# Patient Record
Sex: MALE | Race: WHITE | NOT HISPANIC OR LATINO
[De-identification: names, ages, dates, MRNs, and addresses within clinical notes are randomized per-mention and may not be internally consistent; named-entity substitution may affect disease eponyms.]

---

## 2017-12-22 ENCOUNTER — TRANSCRIPTION ENCOUNTER (OUTPATIENT)
Age: 22
End: 2017-12-22

## 2018-08-03 ENCOUNTER — TRANSCRIPTION ENCOUNTER (OUTPATIENT)
Age: 23
End: 2018-08-03

## 2018-08-03 ENCOUNTER — EMERGENCY (EMERGENCY)
Facility: HOSPITAL | Age: 23
LOS: 1 days | Discharge: HOME | End: 2018-08-03
Attending: EMERGENCY MEDICINE
Payer: COMMERCIAL

## 2018-08-03 VITALS
HEART RATE: 56 BPM | OXYGEN SATURATION: 100 % | TEMPERATURE: 98 F | WEIGHT: 169.98 LBS | RESPIRATION RATE: 20 BRPM | DIASTOLIC BLOOD PRESSURE: 70 MMHG | SYSTOLIC BLOOD PRESSURE: 147 MMHG

## 2018-08-03 VITALS
SYSTOLIC BLOOD PRESSURE: 153 MMHG | OXYGEN SATURATION: 98 % | DIASTOLIC BLOOD PRESSURE: 75 MMHG | RESPIRATION RATE: 18 BRPM | HEART RATE: 54 BPM | TEMPERATURE: 98 F

## 2018-08-03 PROBLEM — Z00.00 ENCOUNTER FOR PREVENTIVE HEALTH EXAMINATION: Status: ACTIVE | Noted: 2018-08-03

## 2018-08-03 LAB
APPEARANCE UR: CLEAR — SIGNIFICANT CHANGE UP
BILIRUB UR-MCNC: NEGATIVE — SIGNIFICANT CHANGE UP
COLOR SPEC: SIGNIFICANT CHANGE UP
DIFF PNL FLD: NEGATIVE — SIGNIFICANT CHANGE UP
GLUCOSE UR QL: NEGATIVE — SIGNIFICANT CHANGE UP
KETONES UR-MCNC: NEGATIVE — SIGNIFICANT CHANGE UP
LEUKOCYTE ESTERASE UR-ACNC: NEGATIVE — SIGNIFICANT CHANGE UP
NITRITE UR-MCNC: NEGATIVE — SIGNIFICANT CHANGE UP
PH UR: 6.5 — SIGNIFICANT CHANGE UP (ref 5–8)
PROT UR-MCNC: NEGATIVE — SIGNIFICANT CHANGE UP
RBC CASTS # UR COMP ASSIST: SIGNIFICANT CHANGE UP /HPF (ref 0–2)
SP GR SPEC: 1.01 — SIGNIFICANT CHANGE UP (ref 1.01–1.02)
UROBILINOGEN FLD QL: NEGATIVE — SIGNIFICANT CHANGE UP
WBC UR QL: SIGNIFICANT CHANGE UP /HPF (ref 0–5)

## 2018-08-03 PROCEDURE — 76870 US EXAM SCROTUM: CPT | Mod: 26

## 2018-08-03 PROCEDURE — 99284 EMERGENCY DEPT VISIT MOD MDM: CPT | Mod: 25

## 2018-08-03 PROCEDURE — 87491 CHLMYD TRACH DNA AMP PROBE: CPT

## 2018-08-03 PROCEDURE — 93975 VASCULAR STUDY: CPT | Mod: 26

## 2018-08-03 PROCEDURE — 93975 VASCULAR STUDY: CPT

## 2018-08-03 PROCEDURE — 87086 URINE CULTURE/COLONY COUNT: CPT

## 2018-08-03 PROCEDURE — 99284 EMERGENCY DEPT VISIT MOD MDM: CPT

## 2018-08-03 PROCEDURE — 81001 URINALYSIS AUTO W/SCOPE: CPT

## 2018-08-03 PROCEDURE — 76870 US EXAM SCROTUM: CPT

## 2018-08-03 PROCEDURE — 87591 N.GONORRHOEAE DNA AMP PROB: CPT

## 2018-08-03 RX ORDER — FINASTERIDE 5 MG/1
0 TABLET, FILM COATED ORAL
Qty: 0 | Refills: 0 | COMMUNITY

## 2018-08-03 NOTE — ED PEDIATRIC NURSE NOTE - NSIMPLEMENTINTERV_GEN_ALL_ED
Implemented All Universal Safety Interventions:  [place] to call system. Call bell, personal items and telephone within reach. Instruct patient to call for assistance. Room bathroom lighting operational. Non-slip footwear when patient is off stretcher. Physically safe environment: no spills, clutter or unnecessary equipment. Stretcher in lowest position, wheels locked, appropriate side rails in place.

## 2018-08-03 NOTE — ED PROVIDER NOTE - OBJECTIVE STATEMENT
4 PM today was on the train when he had onset of pain in the left testicle, rates it as a 7 out of 10, notes that it is a throbbing sensation, nothing makes it better, can make it worse by pressing on the testicle. States that the pain seems to have resolved somewhat over the past two hours and is now only a 4 / 10, had initially noticed some discoloration of the testicle--appeared "purpleish", states its coloration has returned to normal. 4 PM today was on the train when he had onset of pain in the left testicle, rates it as a 7 out of 10, notes that it is a throbbing sensation, nothing makes it better, can make it worse by pressing on the testicle. States that the pain seems to have resolved somewhat over the past two hours and is now only a 4 / 10, had initially noticed some discoloration of the testicle--appeared "reddish", states its coloration has returned to normal.

## 2018-08-03 NOTE — ED PROVIDER NOTE - NS ED ROS FT
General: No fevers / chills  HENT: No ear pain, runny nose, or sore throat  Eyes: No visual changes  CP: No chest pain, palpitations, or light headedness  Resp: No shortness of breath, no cough  GI: + abdominal pain, no diarrhea, no constipation, no nausea, no vomiting  : No dysuria or hematuria  Neuro: No numbness, tingling, or weakness  Endo: No hx of diabetes  Heme: No hx of easy bleeding or bruising

## 2018-08-03 NOTE — ED PROVIDER NOTE - PHYSICAL EXAMINATION
Gen: NAD, non-toxic, conversational  Eyes: PERRL, EOMI   HENT: Normocephalic, atraumatic. External ears normal, no rhinorrhea, moist mucous membranes.   CV: RRR, no M/R/G  Resp: CTAB, non-labored, speaking without difficulty on room air  Abd: soft, non tender, non rigid, no guarding or rebound tenderness  : Mild left testicular ttp diffuse, not localized to epididymitis on right or left, no inguinal hernias, no lesions of the penile shaft, no uretheral discharge.   Back: No CVAT bilaterally, no midline ttp  Skin: dry, wwp   Neuro: AOx3, speech is fluent and appropriate  Psych: Mood good, affect euthymic

## 2018-08-03 NOTE — ED PROVIDER NOTE - MEDICAL DECISION MAKING DETAILS
22y.o. o/w healthy male presenting for evaluation left testicular pain that began 4.5 hours pta, on exam absent cremasteric reflex bilaterally but no discoloration, swelling, or obvious ttp, will eval with urinalysis and testicular u/s, follow up studies, reassess, dispo. 22y.o. o/w healthy male presenting for evaluation left testicular pain that began 4.5 hours pta, on exam absent cremasteric reflex bilaterally but no discoloration, swelling, or obvious ttp, will eval with urinalysis and testicular u/s, follow up studies, reassess, dispo.    Linda Yanes MD - Attending Physician: Pt here with left testicular pain, no associated symptoms. Self improving. Exam unremarkable. US, Ua, Gc/Chl

## 2018-08-03 NOTE — ED PROVIDER NOTE - ATTENDING CONTRIBUTION TO CARE
Linda Yanes MD - Attending Physician: I have personally seen and examined this patient with the resident/fellow.  I have fully participated in the care of this patient. I have reviewed all pertinent clinical information, including history, physical exam, plan and the Resident/Fellow’s note and agree except as noted. See MDM

## 2018-08-03 NOTE — ED PROVIDER NOTE - CARE PLAN
Principal Discharge DX:	Testicular pain, left Principal Discharge DX:	Testicular pain, left  Secondary Diagnosis:	Varicocele

## 2018-08-04 LAB
C TRACH RRNA SPEC QL NAA+PROBE: SIGNIFICANT CHANGE UP
CULTURE RESULTS: NO GROWTH — SIGNIFICANT CHANGE UP
N GONORRHOEA RRNA SPEC QL NAA+PROBE: SIGNIFICANT CHANGE UP
SPECIMEN SOURCE: SIGNIFICANT CHANGE UP
SPECIMEN SOURCE: SIGNIFICANT CHANGE UP

## 2019-05-29 ENCOUNTER — TRANSCRIPTION ENCOUNTER (OUTPATIENT)
Age: 24
End: 2019-05-29

## 2021-02-22 ENCOUNTER — APPOINTMENT (OUTPATIENT)
Dept: ORTHOPEDIC SURGERY | Facility: CLINIC | Age: 26
End: 2021-02-22
Payer: COMMERCIAL

## 2021-02-22 PROCEDURE — 99072 ADDL SUPL MATRL&STAF TM PHE: CPT

## 2021-02-22 PROCEDURE — 99204 OFFICE O/P NEW MOD 45 MIN: CPT

## 2021-02-22 PROCEDURE — 73030 X-RAY EXAM OF SHOULDER: CPT | Mod: LT

## 2021-02-22 RX ORDER — MELOXICAM 7.5 MG/1
7.5 TABLET ORAL
Qty: 21 | Refills: 0 | Status: COMPLETED | COMMUNITY
Start: 2021-02-22 | End: 2021-03-15

## 2021-02-22 NOTE — PHYSICAL EXAM
[de-identified] : Physical Exam:\par General: Well appearing, no acute distress\par Neurologic: A&Ox3, No focal deficits\par Head: NCAT without abrasions, lacerations, or ecchymosis to head, face, or scalp\par Eyes: No scleral icterus, no gross abnormalities\par Respiratory: Equal chest wall expansion bilaterally, no accessory muscle use\par Lymphatic: No lymphadenopathy palpated\par Skin: Warm and dry\par Psychiatric: Normal mood and affect\par \par Left Shoulder:\par ·	Inspection/Palpation: no tenderness, swelling or deformities\par ·	Range of Motion: full and painless in all planes, no crepitus; FF 0-170; ER at side 45; IR to T7\par ·	Strength: forward elevation in scapular plane [_/5], internal rotation [_/5], external rotation [_/5], adduction [_/5] and abduction [_/5]\par ·	Stability: load and shift test positive, apprehension test positive, relocation test positive, sulcus sign negative; Marissa test [negative/positive], Jerk test [negative/positive]\par ·	Tests: Ta negative, Neer negative, negative drop arm test, bear hug test negative, Napolean sign negative, cross arm adduction [negative/positive], lift off sign negative, hornblowers sign negative, speeds test [positive/negative], Yergason's test [positive/negative], bicipital groove tenderness [positive/negative], Moreno's Active Compression test [positive/negative], bicep's load II test [negative/positive]\par \par \par Right Shoulder\par ·	Inspection/Palpation: no tenderness, swelling or deformities\par ·	Range of Motion: full and painless in all planes, no crepitus\par ·	Strength: forward elevation in scapular plane 5/5, internal rotation 5/5, external rotation 5/5, adduction 5/5 and abduction 5/5\par ·	Stability: no joint instability on provocative testing\par ·	Tests: Ta test negative, Neer sign negative, negative drop arm test secondary to pain, bear hug test negative, Napolean sign negative, cross arm adduction negative, lift off sign positive, hornblowers sign negative, speeds test negative, Yergason's test negative, no bicipital groove tenderness, Moreno's Active Compression test negative [de-identified] : MRI L shoulder at [facility] on 01/22/2021 was available for me to review. Results of MRI were discussed thoroughly with patient. L shoulder imaging revealed:\par \par Tearing of the superior and inferior labrum. Additionally suspected anterior labral tearing.\par \par Small impaction injury along the posterior superior aspect of the humeral head, compatible with a Hill-Sachs deformity.\par \par Small joint effusion. 6 mm intra-articular body along the proximal most aspect of the subscapularis recess. \par \par The following radiographs were ordered and read by me during this patients visit. I reviewed each radiograph in detail with the patient and discussed the findings as highlighted below. \par \par 4 views of the left shoulder show no fracture, dislocation or bony lesions. There is no evidence of degenerative change in the glenohumeral and acromioclavicular joints with maintenance of the joint space. Otherwise unremarkable.

## 2021-02-22 NOTE — DISCUSSION/SUMMARY
[de-identified] : CELESTINO KOEHLER is a 25 year male being seen for initial visit L shoulder pain. He reports MIRANDA as dislocation while wrestling on 01/15/2021. Patient denies numbness and tingling to the extremities. He denies use of OTC pain medications. He has not done any formal PT. He had MRI performed at [facility] on 01/22/2021. He saw Dr Martinez through [facility] prior to review this he recommended either PT or surgery based on his MRI Results.\par \par This was his first time dislocating and since has not had subsequent events. Prior he admits to cracking in the shoulder but no subluxation events. He is an avid rock climber, plays soccer and performs [employer].\par \par We had a thorough discussion regarding the nature of his pain, the pathophysiology, as well as all treatment options. We reviewed xrays taken today that were normal, no dislocation. We reviewed previous MRI results as well. With a history of first time dislocation, I've recommended a trial of nonoperative intervention with Mobic as directed and formal PT 2x/week x 6-8 weeks. We will see him back at that ti'me for clinical reassessment. He another dislocation event occurs or if he is refractory, we will further discuss surgical intervention. He agrees with the above plan and all questions were answered.\par

## 2021-02-22 NOTE — HISTORY OF PRESENT ILLNESS
[Stable] : stable [___ wks] : [unfilled] week(s) ago [3] : an average pain level of 3/10 [Lifting] : worsened by lifting [de-identified] : CELESTINO KOEHLER is a 25 year male being seen for initial visit L shoulder pain. He reports MIRANDA as dislocation while wrestling on 01/15/2021. Patient denies numbness and tingling to the extremities. He denies use of OTC pain medications. He has not done any formal PT. He had MRI performed at [facility] on 01/22/2021. He saw Dr Martinez through [facility] prior to review this he recommended either PT or surgery based on his MRI Results.\par \par This was his first time dislocating and since has not had subsequent events. Prior he admits to cracking in the shoulder but no subluxation events. 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

## 2021-03-05 ENCOUNTER — NON-APPOINTMENT (OUTPATIENT)
Age: 26
End: 2021-03-05

## 2021-03-05 ENCOUNTER — APPOINTMENT (OUTPATIENT)
Dept: ORTHOPEDIC SURGERY | Facility: AMBULATORY SURGERY CENTER | Age: 26
End: 2021-03-05
Payer: COMMERCIAL

## 2021-03-05 PROCEDURE — 29807 SHO ARTHRS SRG RPR SLAP LES: CPT | Mod: 59,LT

## 2021-03-05 PROCEDURE — 29806 SHO ARTHRS SRG CAPSULORRAPHY: CPT | Mod: LT

## 2021-03-05 RX ORDER — ACETAMINOPHEN 500 MG/1
500 TABLET ORAL
Qty: 30 | Refills: 0 | Status: COMPLETED | COMMUNITY
Start: 2021-03-05 | End: 2021-03-15

## 2021-03-05 RX ORDER — OXYCODONE 5 MG/1
5 TABLET ORAL
Qty: 10 | Refills: 0 | Status: COMPLETED | COMMUNITY
Start: 2021-03-05 | End: 2021-03-10

## 2021-03-05 RX ORDER — ONDANSETRON 4 MG/1
4 TABLET ORAL
Qty: 18 | Refills: 0 | Status: COMPLETED | COMMUNITY
Start: 2021-03-05 | End: 2021-03-10

## 2021-03-10 ENCOUNTER — NON-APPOINTMENT (OUTPATIENT)
Age: 26
End: 2021-03-10

## 2021-03-10 RX ORDER — CLINDAMYCIN HYDROCHLORIDE 300 MG/1
300 CAPSULE ORAL EVERY 8 HOURS
Qty: 15 | Refills: 1 | Status: ACTIVE | COMMUNITY
Start: 2021-03-10 | End: 1900-01-01

## 2021-03-18 ENCOUNTER — APPOINTMENT (OUTPATIENT)
Dept: ORTHOPEDIC SURGERY | Facility: CLINIC | Age: 26
End: 2021-03-18
Payer: COMMERCIAL

## 2021-03-18 PROCEDURE — 99024 POSTOP FOLLOW-UP VISIT: CPT

## 2021-03-18 PROCEDURE — 73030 X-RAY EXAM OF SHOULDER: CPT | Mod: LT

## 2021-03-18 NOTE — HISTORY OF PRESENT ILLNESS
[Xray (Date:___)] : [unfilled] Xray -  [___ Days Post Op] : post op day #[unfilled] [Clean/Dry/Intact] : clean, dry and intact [Neuro Intact] : an unremarkable neurological exam [Vascular Intact] : ~T peripheral vascular exam normal [Doing Well] : is doing well [Excellent Pain Control] : has excellent pain control [Chills] : no chills [Fever] : no fever [Nausea] : no nausea [Vomiting] : no vomiting [de-identified] : s/p left shoulder arthroscopy with bankart repair, capsulorraphy, SLAP repair, rmeoval of loose bodies, DOS: 3/5/21 [de-identified] : Brian is a 25 year old male who is s/p left shoulder arthroscopy with bankart repair, capsulorraphy, SLAP repair, rmeoval of loose bodies, 13 days ago. He denies fever/ chills or signs of infection. He presents today in his sling. He did not require narcotics for pain. He has not started PT yet. Patient denies numbness and tingling to the extremities. He reports some feelings of instability in the shoulder [de-identified] : Left Shoulder\par \par Incisions are clean, dry and intact. No surrounding erythema. No drainage. Wound appears to be healing well. Unable to test ROM due to recent surgical procedure. Motor and sensation are intact distally. He has full range of motion of all fingers with capillary refill of <2 seconds throughout. He has good nerve function and median nerve, ulnar, radial, PIN, AIN. He has no sensory deficits over the C5-T1 nerve roots. Radial pulses 2+. Able to make an A-OK sign and thumbs up sign: able to flex/extend thumb, able to cross middle over index finger. \par \par Full ROM elbow, wrist, hand [de-identified] : 2 view xrays of pts left shoulder were performed today and available for me to review. They demonstrate adequate alignment. No fracture. No dislocation. No other deformities. [de-identified] : CELESTINO is a 25 year male who is doing extremely well and is without complaints. He presents today in his sling on. Surgical sites are clean and dry without warmth or erythema, pt denies fever or chills.  He is to remain in the sling until we see him again at his next post-op filemon and continue with being fully nonweightbearing to this extremity. He will start PT in 2 weeks from today 2-3x/week alongside HEP until we see them again in 4 weeks for clinical reassessment. He may use tylenol prn for pain. He agrees to the latter plan and does not have any further questions or concerns.

## 2021-04-20 ENCOUNTER — APPOINTMENT (OUTPATIENT)
Dept: ORTHOPEDIC SURGERY | Facility: CLINIC | Age: 26
End: 2021-04-20

## 2021-04-23 ENCOUNTER — APPOINTMENT (OUTPATIENT)
Dept: ORTHOPEDIC SURGERY | Facility: CLINIC | Age: 26
End: 2021-04-23

## 2021-04-30 ENCOUNTER — APPOINTMENT (OUTPATIENT)
Dept: ORTHOPEDIC SURGERY | Facility: CLINIC | Age: 26
End: 2021-04-30
Payer: COMMERCIAL

## 2021-04-30 PROCEDURE — 99024 POSTOP FOLLOW-UP VISIT: CPT

## 2021-04-30 NOTE — HISTORY OF PRESENT ILLNESS
[___ Weeks Post Op] : [unfilled] weeks post op [Clean/Dry/Intact] : clean, dry and intact [Neuro Intact] : an unremarkable neurological exam [Vascular Intact] : ~T peripheral vascular exam normal [Doing Well] : is doing well [Excellent Pain Control] : has excellent pain control [Chills] : no chills [Fever] : no fever [Nausea] : no nausea [Vomiting] : no vomiting [de-identified] : s/p left shoulder arthroscopy with bankart repair, capsulorraphy, SLAP repair, removal of loose bodies, DOS: 03/05/21 [de-identified] : Brian is a 25 year old male who is s/p left shoulder arthroscopy with bankart repair, capsulorraphy, SLAP repair, removal of loose bodies, 8 weeks ago. He reports going to PT 2x/week with relief in pain and increase in ROM. Patient denies numbness and tingling to the extremities. He denies use of OTC pain medications. He admits to some pain with ER at 0 deg ABD. [de-identified] : Left Shoulder\par \par Incisions are clean, dry and intact. No surrounding erythema. No drainage. Wounds healed well. Active ROM 0-140, ER to 15, IR to back pocket. Motor and sensation are intact distally. He has full range of motion of all fingers with capillary refill of <2 seconds throughout. He has good nerve function and median nerve, ulnar, radial, PIN, AIN. He has no sensory deficits over the C5-T1 nerve roots. Radial pulses 2+. Able to make an A-OK sign and thumbs up sign: able to flex/extend thumb, able to cross middle over index finger. \par \par Full ROM elbow, wrist, hand [de-identified] : No new imaging performed today. [de-identified] : CELESTINO is a 25 year male who is doing well. He presents today without a sling on and is tolerating this well. Surgical sites are clean and dry without warmth or erythema, pt denies fever or chills.  He will continue PT increasing to 3x/week alongside HEP until we see them again in 6 weeks for clinical reassessment. He will advance to the next phase following his protocol packet. He may use tylenol prn for pain. He agrees to the latter plan and does not have any further questions or concerns.

## 2021-06-10 ENCOUNTER — APPOINTMENT (OUTPATIENT)
Dept: ORTHOPEDIC SURGERY | Facility: CLINIC | Age: 26
End: 2021-06-10
Payer: COMMERCIAL

## 2021-06-10 PROCEDURE — 99072 ADDL SUPL MATRL&STAF TM PHE: CPT

## 2021-06-10 PROCEDURE — 99213 OFFICE O/P EST LOW 20 MIN: CPT

## 2021-06-10 NOTE — ADDENDUM
[FreeTextEntry1] : Documented by Lefty Seaman acting as a scribe for Dr. Florence on 06/10/2021. \par \par All medical record entries made by the Scribe were at my, Dr. Florence's, direction and\par personally dictated by me on 06/10/2021. I have reviewed the chart and agree that the record\par accurately reflects my personal performance of the history, physical exam, procedure and imaging.

## 2021-06-10 NOTE — HISTORY OF PRESENT ILLNESS
[___ Weeks Post Op] : [unfilled] weeks post op [Clean/Dry/Intact] : clean, dry and intact [Neuro Intact] : an unremarkable neurological exam [Vascular Intact] : ~T peripheral vascular exam normal [Doing Well] : is doing well [Excellent Pain Control] : has excellent pain control [Chills] : no chills [Fever] : no fever [Nausea] : no nausea [Vomiting] : no vomiting [de-identified] : s/p left shoulder arthroscopy with bankart repair, capsulorraphy, SLAP repair, removal of loose bodies, DOS: 03/05/21 [de-identified] : Brian is a 25 year old male who is s/p left shoulder arthroscopy with bankart repair, capsulorraphy, SLAP repair, removal of loose bodies, 12 weeks ago. He reports going to PT 2x/week with relief, but has recently moved into [facility] and needs to find a new PT. He notes lacking L shoulder ER and pain with ER.  Patient denies numbness and tingling to the extremities. He denies use of OTC pain medications.  [de-identified] : Left Shoulder\par \par Incisions are clean, dry and intact. No surrounding erythema. No drainage. Wounds healed well. Active ROM 0-170, passive 0-180 w/ mild pain, ER to 35 active, 55 passive, good arc of motion, strength 5/5. Motor and sensation are intact distally. He has full range of motion of all fingers with capillary refill of <2 seconds throughout. He has good nerve function and median nerve, ulnar, radial, PIN, AIN. He has no sensory deficits over the C5-T1 nerve roots. Radial pulses 2+. Able to make an A-OK sign and thumbs up sign: able to flex/extend thumb, able to cross middle over index finger. \par \par Full ROM elbow, wrist, hand [de-identified] : No new imaging performed today. [de-identified] : CELESTINO is a 25 year male who is doing well. He presents today without a sling on and is tolerating this well. Surgical sites are clean and dry without warmth or erythema, pt denies fever or chills.  He will continue PT increasing to 3x/week alongside HEP until we see them again in 3 months for clinical reassessment. Patient was given prescription of formal physical therapy. He has good strength in RTC muscle, due to lacking 10 degrees in ER, he is to continue PT along w/ HEP. I advised he could do minimal weight training, as tolerated, and emphasized certain exercises he could do. He may use tylenol prn for pain. He agrees to the latter plan and does not have any further questions or concerns.

## 2021-09-20 ENCOUNTER — APPOINTMENT (OUTPATIENT)
Dept: ORTHOPEDIC SURGERY | Facility: CLINIC | Age: 26
End: 2021-09-20

## 2021-09-20 DIAGNOSIS — M21.822 OTHER SPECIFIED ACQUIRED DEFORMITIES OF LEFT UPPER ARM: ICD-10-CM

## 2021-09-20 DIAGNOSIS — S43.432A SUPERIOR GLENOID LABRUM LESION OF LEFT SHOULDER, INITIAL ENCOUNTER: ICD-10-CM

## 2021-09-20 DIAGNOSIS — S43.005A UNSPECIFIED DISLOCATION OF LEFT SHOULDER JOINT, INITIAL ENCOUNTER: ICD-10-CM

## 2021-09-20 DIAGNOSIS — M24.012 LOOSE BODY IN LEFT SHOULDER: ICD-10-CM

## 2021-09-20 DIAGNOSIS — Z98.890 OTHER SPECIFIED POSTPROCEDURAL STATES: ICD-10-CM

## 2021-09-23 NOTE — HISTORY OF PRESENT ILLNESS
[de-identified] : CELESTINO is a 26 year male who presents today for follow up s/p left shoulder arthroscopy with Bankart repair, capsulorraphy, SLAP repair and removal of loose bodies, 6.5 months ago. Currently,

## 2021-09-23 NOTE — DISCUSSION/SUMMARY
[de-identified] : CELESTINO is a 25 year male who is doing well, post-operatively. Surgical sites are clean and dry without warmth or erythema, fully healed.

## 2021-09-23 NOTE — REVIEW OF SYSTEMS
[Negative] : Heme/Lymph [Joint Pain] : no joint pain [Joint Stiffness] : no joint stiffness [Joint Swelling] : no joint swelling [FreeTextEntry9] : aftercare: left shoulder

## 2021-09-23 NOTE — PHYSICAL EXAM
[de-identified] : Physical Exam:\par General: Well appearing, no acute distress\par Neurologic: A&Ox3, No focal deficits\par Head: NCAT without abrasions, lacerations, or ecchymosis to head, face, or scalp\par Eyes: No scleral icterus, no gross abnormalities\par Respiratory: Equal chest wall expansion bilaterally, no accessory muscle use\par Lymphatic: No lymphadenopathy palpated\par Skin: Warm and dry\par Psychiatric: Normal mood and affect\par \par Left Shoulder\par \par Incisions are clean, dry and intact. No surrounding erythema. No drainage. Wounds healed well. Active ROM 0-170, passive 0-180 w/ mild pain, ER to 35 active, 55 passive, good arc of motion, strength 5/5. Motor and sensation are intact distally. He has full range of motion of all fingers with capillary refill of <2 seconds throughout. He has good nerve function and median nerve, ulnar, radial, PIN, AIN. He has no sensory deficits over the C5-T1 nerve roots. Radial pulses 2+. Able to make an A-OK sign and thumbs up sign: able to flex/extend thumb, able to cross middle over index finger. \par \par Full ROM elbow, wrist, hand

## 2021-10-05 ENCOUNTER — APPOINTMENT (OUTPATIENT)
Dept: ORTHOPEDIC SURGERY | Facility: CLINIC | Age: 26
End: 2021-10-05

## 2021-11-25 ENCOUNTER — TRANSCRIPTION ENCOUNTER (OUTPATIENT)
Age: 26
End: 2021-11-25

## 2022-06-06 ENCOUNTER — NON-APPOINTMENT (OUTPATIENT)
Age: 27
End: 2022-06-06

## 2023-05-23 ENCOUNTER — APPOINTMENT (OUTPATIENT)
Dept: INTERNAL MEDICINE | Facility: CLINIC | Age: 28
End: 2023-05-23

## 2023-08-01 ENCOUNTER — APPOINTMENT (OUTPATIENT)
Dept: ORTHOPEDIC SURGERY | Facility: CLINIC | Age: 28
End: 2023-08-01
Payer: COMMERCIAL

## 2023-08-01 DIAGNOSIS — M25.852 OTHER SPECIFIED JOINT DISORDERS, RIGHT HIP: ICD-10-CM

## 2023-08-01 DIAGNOSIS — M25.851 OTHER SPECIFIED JOINT DISORDERS, RIGHT HIP: ICD-10-CM

## 2023-08-01 DIAGNOSIS — S76.311A STRAIN OF MUSCLE, FASCIA AND TENDON OF THE POSTERIOR MUSCLE GROUP AT THIGH LEVEL, RIGHT THIGH, INITIAL ENCOUNTER: ICD-10-CM

## 2023-08-01 PROCEDURE — 73503 X-RAY EXAM HIP UNI 4/> VIEWS: CPT

## 2023-08-01 PROCEDURE — 99214 OFFICE O/P EST MOD 30 MIN: CPT

## 2023-08-01 NOTE — DISCUSSION/SUMMARY
[de-identified] : We had a thorough discussion regarding the nature of his pain, the pathophysiology, as well as all treatment options. Based on clinical exam and radiograph findings he has R biceps femoris strain. Patient was given prescription of formal physical therapy that he will perform 2x/wk for 6-8 wks. All questions were answered and the patient verbalized understanding. The patient is in agreement with this treatment plan.

## 2023-08-01 NOTE — CONSULT LETTER
[Dear  ___] : Dear  [unfilled], [Consult Letter:] : I had the pleasure of evaluating your patient, [unfilled]. [Please see my note below.] : Please see my note below. [Sincerely,] : Sincerely, [FreeTextEntry3] : Dr. Philip Florence

## 2023-08-01 NOTE — ADDENDUM
[FreeTextEntry1] : Documented by Marielena Cai acting as a scribe for Dr. Florence on 08/01/2023. All medical record entries made by the Scribe were at my, Dr. Florence's, direction and personally dictated by me on 08/01/2023. I have reviewed the chart and agree that the record accurately reflects my personal performance of the history, physical exam, procedure and imaging.

## 2023-08-01 NOTE — HISTORY OF PRESENT ILLNESS
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  Patient states 2 days ago he was doing a competition when a leg sweep was performed and he was forced into knee flexion.  He noted a pop and felt pain in the hamstring muscle belly.  He has been able to ambulate and is feeling a little bit better today.  He needs no assistive devices.  In his past history he has always had stiff hips and occasional hip flexor issues.  He has not had any prior hamstring issues.  He works in cyber security.

## 2023-08-01 NOTE — PHYSICAL EXAM
[de-identified] : General: Well appearing, no acute distress Neurologic: A&Ox3, No focal deficits Head: NCAT without abrasions, lacerations, or ecchymosis to head, face, or scalp Eyes: No scleral icterus, no gross abnormalities Respiratory: Equal chest wall expansion bilaterally, no accessory muscle use Lymphatic: No lymphadenopathy palpated Skin: Warm and dry Psychiatric: Normal mood and affect  Right leg: Decreased hip flexion with extension. Mild weakness with resisted hip extension. No weakness with knee flexion. Hip impingement tests are positive bilaterally. [de-identified] : The following radiographs were ordered and read by me during this patient's visit. I reviewed each radiograph in detail with the patient and discussed the findings as highlighted below. 3 views of the pelvis were obtained today that show no fracture, dislocation. There is os acetabuli with hip impingement.   2 views of the R hip were obtained today that show no fracture, dislocation. There is os acetabuli with hip impingement.

## 2024-12-03 ENCOUNTER — APPOINTMENT (OUTPATIENT)
Dept: GASTROENTEROLOGY | Facility: CLINIC | Age: 29
End: 2024-12-03
